# Patient Record
Sex: MALE | Race: WHITE | URBAN - METROPOLITAN AREA
[De-identification: names, ages, dates, MRNs, and addresses within clinical notes are randomized per-mention and may not be internally consistent; named-entity substitution may affect disease eponyms.]

---

## 2024-02-22 ENCOUNTER — OFFICE VISIT (OUTPATIENT)
Age: 39
End: 2024-02-22

## 2024-02-22 VITALS
SYSTOLIC BLOOD PRESSURE: 134 MMHG | BODY MASS INDEX: 27.52 KG/M2 | DIASTOLIC BLOOD PRESSURE: 79 MMHG | OXYGEN SATURATION: 97 % | HEART RATE: 57 BPM | TEMPERATURE: 97.9 F | WEIGHT: 203.2 LBS | HEIGHT: 72 IN

## 2024-02-22 DIAGNOSIS — R68.89 FLU-LIKE SYMPTOMS: Primary | ICD-10-CM

## 2024-02-22 LAB
INFLUENZA A ANTIBODY: NEGATIVE
INFLUENZA B ANTIBODY: NEGATIVE
STREPTOCOCCUS A RNA: NEGATIVE

## 2024-02-22 RX ORDER — BUPRENORPHINE AND NALOXONE 8; 2 MG/1; MG/1
1 FILM, SOLUBLE BUCCAL; SUBLINGUAL DAILY
COMMUNITY

## 2024-02-22 ASSESSMENT — ENCOUNTER SYMPTOMS
SORE THROAT: 1
RHINORRHEA: 1
COUGH: 0
NAUSEA: 1
VOMITING: 0
DIARRHEA: 0

## 2024-02-22 NOTE — PROGRESS NOTES
Tr Evans (:  1985) is a 38 y.o. male,New patient, here for evaluation of the following chief complaint(s):  Sinusitis, Dizziness, and Nasal Congestion (Pt c/o dizziness, runny nose/burning, sinus pressure since Saturday.)      ASSESSMENT/PLAN:  Visit Diagnoses and Associated Orders       Flu-like symptoms    -  Primary    POCT Rapid Strep A DNA [23587 Custom]      POCT Influenza A/B [50549 Custom]           ORDERS WITHOUT AN ASSOCIATED DIAGNOSIS    buprenorphine-naloxone (SUBOXONE) 8-2 MG FILM SL film [[id number]]               Flu and Strep tests neg today.  Suspect you are recovering from influenza with lingering symptoms.  Duration of symptoms beyond recommendation for Tamiflu.  Continue conservative care at home including rest, hydration and Tylenol/Ibuprofen for fever and body aches.  Review printed materials.  Return to work on Monday.  Return if symptoms persist or change.  Proceed to hospital for evaluation if any worsening symptoms or concerns.    SUBJECTIVE/OBJECTIVE:      History provided by:  Patient   used: No    Generalized Body Aches  Severity:  Moderate  Duration:  4 days  Timing:  Constant  Progression:  Unchanged  Context:  Co workers sick with Flu and Strep  Associated symptoms: congestion, fatigue, myalgias, nausea, rhinorrhea and sore throat    Associated symptoms: no cough, no diarrhea and no vomiting    Associated symptoms comment:  Mild cough      ROS: See HPI       Vitals:    24 1408   BP: 134/79   Pulse: 57   Temp: 97.9 °F (36.6 °C)   TempSrc: Oral   SpO2: 97%   Weight: 92.2 kg (203 lb 3.2 oz)   Height: 1.829 m (6')       Results for POC orders placed in visit on 24   POCT Rapid Strep A DNA   Result Value Ref Range    Streptococcus A RNA negative    POCT Influenza A/B   Result Value Ref Range    Influenza A Ab negative     Influenza B Ab negative         Physical Exam  Vitals and nursing note reviewed.   Constitutional:       Appearance: He is

## 2024-02-22 NOTE — PATIENT INSTRUCTIONS
Flu and Strep tests neg today.  Suspect you are recovering from influenza with lingering symptoms.  Duration of symptoms beyond recommendation for Tamiflu.  Continue conservative care at home including rest, hydration and Tylenol/Ibuprofen for fever and body aches.  Review printed materials.  Return to work on Monday.  Return if symptoms persist or change.  Proceed to hospital for evaluation if any worsening symptoms or concerns.

## 2025-02-14 ENCOUNTER — APPOINTMENT (OUTPATIENT)
Dept: CT IMAGING | Age: 40
End: 2025-02-14
Payer: COMMERCIAL

## 2025-02-14 ENCOUNTER — HOSPITAL ENCOUNTER (EMERGENCY)
Age: 40
Discharge: HOME | End: 2025-02-14
Attending: EMERGENCY MEDICINE
Payer: COMMERCIAL

## 2025-02-14 VITALS
BODY MASS INDEX: 25.71 KG/M2 | RESPIRATION RATE: 20 BRPM | OXYGEN SATURATION: 98 % | SYSTOLIC BLOOD PRESSURE: 160 MMHG | DIASTOLIC BLOOD PRESSURE: 103 MMHG | HEIGHT: 72 IN | TEMPERATURE: 98.2 F | WEIGHT: 189.8 LBS | HEART RATE: 62 BPM

## 2025-02-14 DIAGNOSIS — R03.0 ELEVATED BLOOD PRESSURE READING: ICD-10-CM

## 2025-02-14 DIAGNOSIS — S22.020A COMPRESSION FRACTURE OF T2 VERTEBRA, INITIAL ENCOUNTER (HCC): ICD-10-CM

## 2025-02-14 DIAGNOSIS — S16.1XXA ACUTE STRAIN OF NECK MUSCLE, INITIAL ENCOUNTER: ICD-10-CM
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
## 2025-02-14 DIAGNOSIS — S09.90XA CLOSED HEAD INJURY, INITIAL ENCOUNTER: ICD-10-CM

## 2025-02-14 PROCEDURE — 72125 CT NECK SPINE W/O DYE: CPT

## 2025-02-14 PROCEDURE — 70450 CT HEAD/BRAIN W/O DYE: CPT

## 2025-02-14 PROCEDURE — 99284 EMERGENCY DEPT VISIT MOD MDM: CPT

## 2025-02-14 PROCEDURE — 6370000000 HC RX 637 (ALT 250 FOR IP): Performed by: EMERGENCY MEDICINE

## 2025-02-14 RX ORDER — BUTALBITAL, ACETAMINOPHEN AND CAFFEINE 50; 325; 40 MG/1; MG/1; MG/1
1 TABLET ORAL ONCE
Status: COMPLETED | OUTPATIENT
Start: 2025-02-14 | End: 2025-02-14

## 2025-02-14 RX ADMIN — BUTALBITAL, ACETAMINOPHEN, AND CAFFEINE 1 TABLET: 50; 325; 40 TABLET ORAL at 13:28

## 2025-02-14 ASSESSMENT — PAIN DESCRIPTION - LOCATION: LOCATION: HEAD

## 2025-02-14 ASSESSMENT — LIFESTYLE VARIABLES
HOW MANY STANDARD DRINKS CONTAINING ALCOHOL DO YOU HAVE ON A TYPICAL DAY: PATIENT DOES NOT DRINK
HOW OFTEN DO YOU HAVE A DRINK CONTAINING ALCOHOL: NEVER

## 2025-02-14 ASSESSMENT — PAIN SCALES - GENERAL: PAINLEVEL_OUTOF10: 10

## 2025-02-14 NOTE — ED TRIAGE NOTES
Patient oriented to room and ED throughput process.  Safety measures with ED bed locked in lowest position and call light in reach.  Patient educated on all orders, including any medications.  Patient educated on chief complaint/symptoms. Patient encouraged to ask questions regarding care, medications or treatment plan.  Patient aware of how to reach staff with questions/concerns.     (0) blue, pale